# Patient Record
Sex: FEMALE | Race: BLACK OR AFRICAN AMERICAN | Employment: FULL TIME | URBAN - METROPOLITAN AREA
[De-identification: names, ages, dates, MRNs, and addresses within clinical notes are randomized per-mention and may not be internally consistent; named-entity substitution may affect disease eponyms.]

---

## 2020-05-10 ENCOUNTER — APPOINTMENT (OUTPATIENT)
Dept: GENERAL RADIOLOGY | Age: 40
End: 2020-05-10
Attending: EMERGENCY MEDICINE
Payer: COMMERCIAL

## 2020-05-10 ENCOUNTER — HOSPITAL ENCOUNTER (EMERGENCY)
Age: 40
Discharge: HOME | End: 2020-05-11
Attending: EMERGENCY MEDICINE
Payer: COMMERCIAL

## 2020-05-10 DIAGNOSIS — S62.317A CLOSED DISPLACED FRACTURE OF BASE OF FIFTH METACARPAL BONE OF LEFT HAND, INITIAL ENCOUNTER: Primary | ICD-10-CM

## 2020-05-10 PROCEDURE — 73630 X-RAY EXAM OF FOOT: CPT

## 2020-05-10 PROCEDURE — 73610 X-RAY EXAM OF ANKLE: CPT

## 2020-05-10 PROCEDURE — 99285 EMERGENCY DEPT VISIT HI MDM: CPT

## 2020-05-10 PROCEDURE — 74011250637 HC RX REV CODE- 250/637: Performed by: EMERGENCY MEDICINE

## 2020-05-10 PROCEDURE — 75810000053 HC SPLINT APPLICATION

## 2020-05-10 RX ORDER — ACETAMINOPHEN 500 MG
1000 TABLET ORAL
Status: COMPLETED | OUTPATIENT
Start: 2020-05-10 | End: 2020-05-10

## 2020-05-10 RX ORDER — HYDROCODONE BITARTRATE AND ACETAMINOPHEN 5; 325 MG/1; MG/1
1 TABLET ORAL
Qty: 15 TAB | Refills: 0 | Status: SHIPPED | OUTPATIENT
Start: 2020-05-10 | End: 2020-05-13

## 2020-05-10 RX ORDER — CYCLOBENZAPRINE HCL 10 MG
TABLET ORAL
COMMUNITY

## 2020-05-10 RX ORDER — HYDROCODONE BITARTRATE AND ACETAMINOPHEN 5; 325 MG/1; MG/1
2 TABLET ORAL
Status: COMPLETED | OUTPATIENT
Start: 2020-05-11 | End: 2020-05-11

## 2020-05-10 RX ORDER — HYDROXYCHLOROQUINE SULFATE 200 MG/1
200 TABLET, FILM COATED ORAL DAILY
COMMUNITY

## 2020-05-10 RX ADMIN — ACETAMINOPHEN 1000 MG: 500 TABLET, FILM COATED ORAL at 23:00

## 2020-05-11 VITALS
HEIGHT: 64 IN | TEMPERATURE: 98.2 F | RESPIRATION RATE: 14 BRPM | HEART RATE: 96 BPM | SYSTOLIC BLOOD PRESSURE: 127 MMHG | OXYGEN SATURATION: 93 % | DIASTOLIC BLOOD PRESSURE: 75 MMHG | BODY MASS INDEX: 39.61 KG/M2 | WEIGHT: 232 LBS

## 2020-05-11 PROCEDURE — 74011250637 HC RX REV CODE- 250/637: Performed by: EMERGENCY MEDICINE

## 2020-05-11 RX ADMIN — HYDROCODONE BITARTRATE AND ACETAMINOPHEN 2 TABLET: 5; 325 TABLET ORAL at 00:09

## 2020-05-11 NOTE — ED TRIAGE NOTES
Patient arrives via EMS for c/o, \"I was walking and missed my last step and rolled my ankle. \" Pain and swelling noted to left ankle. Denies pain elsewhere.

## 2020-05-11 NOTE — ED PROVIDER NOTES
Date of Service:  5/10/2020    Patient:  Katya Walsh    Chief Complaint:  Ankle Pain       HPI:  Katya Walsh is a 44 y.o.  female who presents for evaluation of left ankle/lateral foot pain after a fall. Patient was walking down some stairs when she missed the last step and fell. She did not hit her head or actually fall to the ground, she stumbled, injuring her ankle. Pain isolated to lateral left foot and ankle. She was able to walk on it afterwards but states the pain started to increase. No numbness, no tingling. No knee pain. NO LOC, CP,SOB or other complaints. Pain non radiating, 7/10 worse with palpation or attempted ambulation. Past Medical History:   Diagnosis Date    Lupus [facility])        History reviewed. No pertinent surgical history. History reviewed. No pertinent family history. Social History     Socioeconomic History    Marital status: Not on file     Spouse name: Not on file    Number of children: Not on file    Years of education: Not on file    Highest education level: Not on file   Occupational History    Not on file   Social Needs    Financial resource strain: Not on file    Food insecurity     Worry: Not on file     Inability: Not on file    Transportation needs     Medical: Not on file     Non-medical: Not on file   Tobacco Use    Smoking status: Never Smoker    Smokeless tobacco: Never Used   Substance and Sexual Activity    Alcohol use:  Yes    Drug use: Not on file    Sexual activity: Not on file   Lifestyle    Physical activity     Days per week: Not on file     Minutes per session: Not on file    Stress: Not on file   Relationships    Social connections     Talks on phone: Not on file     Gets together: Not on file     Attends Buddhism service: Not on file     Active member of club or organization: Not on file     Attends meetings of clubs or organizations: Not on file     Relationship status: Not on file    Intimate partner violence     Fear of current or ex partner: Not on file     Emotionally abused: Not on file     Physically abused: Not on file     Forced sexual activity: Not on file   Other Topics Concern    Not on file   Social History Narrative    Not on file         ALLERGIES: Patient has no known allergies. Review of Systems   Constitutional: Negative for fever. HENT: Negative for sore throat. Eyes: Negative for visual disturbance. Respiratory: Negative for shortness of breath. Cardiovascular: Negative for chest pain. Gastrointestinal: Negative for abdominal pain. Genitourinary: Negative for flank pain. Musculoskeletal: Positive for gait problem and joint swelling. Skin: Negative for rash. Neurological: Negative for dizziness, weakness, light-headedness and numbness. Psychiatric/Behavioral: Negative for confusion. Vitals:    05/10/20 2246   BP: (!) 148/96   Pulse: 96   Resp: 14   Temp: 98.7 °F (37.1 °C)   SpO2: 100%   Weight: 105.2 kg (232 lb)   Height: 5' 4\" (1.626 m)            Physical Exam  Vitals signs and nursing note reviewed. Constitutional:       General: She is not in acute distress. Appearance: Normal appearance. She is well-developed. HENT:      Head: Normocephalic and atraumatic. Eyes:      General: No scleral icterus. Neck:      Musculoskeletal: Normal range of motion. Vascular: No JVD. Trachea: No tracheal deviation. Cardiovascular:      Rate and Rhythm: Normal rate and regular rhythm. Pulses: Normal pulses. Pulmonary:      Effort: Pulmonary effort is normal. No respiratory distress. Abdominal:      Tenderness: There is no abdominal tenderness. Musculoskeletal:      Comments: Lateral left ankle swelling. TTP to the base of 5th digit. ROM at ankle normal with minimal pain, No tenderness of the left knee. Normal ROM. Skin:     General: Skin is warm and dry. Capillary Refill: Capillary refill takes less than 2 seconds. Findings: No rash.    Neurological: Mental Status: She is alert and oriented to person, place, and time. Sensory: No sensory deficit. Psychiatric:         Behavior: Behavior normal.          MDM  Number of Diagnoses or Management Options  Closed displaced fracture of base of fifth metacarpal bone of left hand, initial encounter:        VITAL SIGNS:  Patient Vitals for the past 4 hrs:   Temp Pulse Resp BP SpO2   05/10/20 2246 98.7 °F (37.1 °C) 96 14 (!) 148/96 100 %         LABS:  No results found for this or any previous visit (from the past 6 hour(s)). IMAGING:  XR ANKLE LT MIN 3 V   Final Result   IMPRESSION: Base of the fifth metatarsal fracture. XR FOOT LT MIN 3 V   Final Result   IMPRESSION: Base of the fifth metatarsal fracture. Medications During Visit:  Medications   HYDROcodone-acetaminophen (NORCO) 5-325 mg per tablet 2 Tab (has no administration in time range)   acetaminophen (TYLENOL) tablet 1,000 mg (1,000 mg Oral Given 5/10/20 2300)         DECISION MAKING:  Jaleesa Melton is a 44 y.o. female who comes in as above. Imaging as above. Fracture of the base of the fifth metacarpal.  Patient is provided with a posterior short leg splint, crutches and pain control. She is a 7 AM flight back home to [place] which point she can follow-up with her primary care doctor and her local orthopedist.  Patient agreeable to this plan. All questions answered      IMPRESSION:  1. Closed displaced fracture of base of fifth metacarpal bone of left hand, initial encounter        DISPOSITION:  Discharged      Current Discharge Medication List      START taking these medications    Details   HYDROcodone-acetaminophen (Norco) 5-325 mg per tablet Take 1 Tab by mouth every four (4) hours as needed for Pain for up to 3 days. Max Daily Amount: 6 Tabs.   Qty: 15 Tab, Refills: 0    Associated Diagnoses: Closed displaced fracture of base of fifth metacarpal bone of left hand, initial encounter              Follow-up Information     Follow up With Specialties Details Why Contact Info    Your PCP  Schedule an appointment as soon as possible for a visit               The patient is asked to follow-up with their primary care provider in the next several days. They are to call tomorrow for an appointment. The patient is asked to return promptly for any increased concerns or worsening of symptoms. They can return to this emergency department or any other emergency department.     Procedures

## 2020-05-11 NOTE — ED NOTES
Patient  given copy of dc instructions and  script. Patient  verbalized understanding of instructions and script. Patient  verbalized understanding of the importance of discussing medications with  his or her physician or clinic they will be following up with. Patient alert and oriented and in no acute distress. Patient discharged home  with self with ride home form [place].